# Patient Record
Sex: FEMALE | Race: WHITE | NOT HISPANIC OR LATINO | Employment: FULL TIME
[De-identification: names, ages, dates, MRNs, and addresses within clinical notes are randomized per-mention and may not be internally consistent; named-entity substitution may affect disease eponyms.]

---

## 2017-08-05 ENCOUNTER — HEALTH MAINTENANCE LETTER (OUTPATIENT)
Age: 42
End: 2017-08-05

## 2018-12-07 ENCOUNTER — TELEPHONE (OUTPATIENT)
Dept: OTHER | Facility: CLINIC | Age: 43
End: 2018-12-07

## 2018-12-07 NOTE — TELEPHONE ENCOUNTER
12/7/2018    Call Regarding [employer]boarding [employer] other     Attempt 1    Message on voicemail    Comments: 1 adult, 1 child dep       Outreach   Dilia Inman

## 2018-12-18 NOTE — TELEPHONE ENCOUNTER
12/18/2018    Call Regarding Onboarding [employer] other     Attempt 2    Message on voicemail    Comments:       Outreach   Dilia Inman

## 2019-11-05 ENCOUNTER — HEALTH MAINTENANCE LETTER (OUTPATIENT)
Age: 44
End: 2019-11-05

## 2020-01-21 ENCOUNTER — OFFICE VISIT - HEALTHEAST (OUTPATIENT)
Dept: FAMILY MEDICINE | Facility: CLINIC | Age: 45
End: 2020-01-21

## 2020-01-21 ENCOUNTER — COMMUNICATION - HEALTHEAST (OUTPATIENT)
Dept: TELEHEALTH | Facility: CLINIC | Age: 45
End: 2020-01-21

## 2020-01-21 DIAGNOSIS — Z00.00 ROUTINE GENERAL MEDICAL EXAMINATION AT A HEALTH CARE FACILITY: ICD-10-CM

## 2020-01-21 DIAGNOSIS — Z13.220 LIPID SCREENING: ICD-10-CM

## 2020-01-21 DIAGNOSIS — B00.2 ORAL HERPES: ICD-10-CM

## 2020-01-21 DIAGNOSIS — Z23 ENCOUNTER FOR ADMINISTRATION OF VACCINE: ICD-10-CM

## 2020-01-21 DIAGNOSIS — Z12.31 VISIT FOR SCREENING MAMMOGRAM: ICD-10-CM

## 2020-01-21 DIAGNOSIS — Z13.1 DIABETES MELLITUS SCREENING: ICD-10-CM

## 2020-01-21 DIAGNOSIS — Z76.89 ENCOUNTER TO ESTABLISH CARE WITH NEW DOCTOR: ICD-10-CM

## 2020-01-21 DIAGNOSIS — E66.3 OVERWEIGHT (BMI 25.0-29.9): ICD-10-CM

## 2020-01-21 DIAGNOSIS — Z12.4 CERVICAL CANCER SCREENING: ICD-10-CM

## 2020-01-21 LAB
CHOLEST SERPL-MCNC: 160 MG/DL
FASTING STATUS PATIENT QL REPORTED: YES
FASTING STATUS PATIENT QL REPORTED: YES
GLUCOSE BLD-MCNC: 76 MG/DL (ref 70–125)
HDLC SERPL-MCNC: 55 MG/DL
HGB BLD-MCNC: 14 G/DL (ref 12–16)
LDLC SERPL CALC-MCNC: 85 MG/DL
TRIGL SERPL-MCNC: 100 MG/DL

## 2020-01-21 ASSESSMENT — ANXIETY QUESTIONNAIRES
3. WORRYING TOO MUCH ABOUT DIFFERENT THINGS: NOT AT ALL
7. FEELING AFRAID AS IF SOMETHING AWFUL MIGHT HAPPEN: NOT AT ALL
GAD7 TOTAL SCORE: 0
1. FEELING NERVOUS, ANXIOUS, OR ON EDGE: NOT AT ALL
5. BEING SO RESTLESS THAT IT IS HARD TO SIT STILL: NOT AT ALL
2. NOT BEING ABLE TO STOP OR CONTROL WORRYING: NOT AT ALL
4. TROUBLE RELAXING: NOT AT ALL
6. BECOMING EASILY ANNOYED OR IRRITABLE: NOT AT ALL

## 2020-01-21 ASSESSMENT — MIFFLIN-ST. JEOR: SCORE: 1455.83

## 2020-01-21 ASSESSMENT — PATIENT HEALTH QUESTIONNAIRE - PHQ9: SUM OF ALL RESPONSES TO PHQ QUESTIONS 1-9: 0

## 2020-01-22 ENCOUNTER — COMMUNICATION - HEALTHEAST (OUTPATIENT)
Dept: FAMILY MEDICINE | Facility: CLINIC | Age: 45
End: 2020-01-22

## 2020-01-22 LAB
HPV SOURCE: NORMAL
HUMAN PAPILLOMA VIRUS 16 DNA: NEGATIVE
HUMAN PAPILLOMA VIRUS 18 DNA: NEGATIVE
HUMAN PAPILLOMA VIRUS FINAL DIAGNOSIS: NORMAL
HUMAN PAPILLOMA VIRUS OTHER HR: NEGATIVE
SPECIMEN DESCRIPTION: NORMAL

## 2020-01-29 LAB
BKR LAB AP ABNORMAL BLEEDING: NO
BKR LAB AP BIRTH CONTROL/HORMONES: NORMAL
BKR LAB AP CERVICAL APPEARANCE: NORMAL
BKR LAB AP GYN ADEQUACY: NORMAL
BKR LAB AP GYN INTERPRETATION: NORMAL
BKR LAB AP HPV REFLEX: NORMAL
BKR LAB AP LMP: NORMAL
BKR LAB AP PATIENT STATUS: NORMAL
BKR LAB AP PREVIOUS ABNORMAL: NORMAL
BKR LAB AP PREVIOUS NORMAL: 2013
HIGH RISK?: NO
PATH REPORT.COMMENTS IMP SPEC: NORMAL
RESULT FLAG (HE HISTORICAL CONVERSION): NORMAL

## 2020-02-04 ENCOUNTER — COMMUNICATION - HEALTHEAST (OUTPATIENT)
Dept: FAMILY MEDICINE | Facility: CLINIC | Age: 45
End: 2020-02-04

## 2020-05-28 ENCOUNTER — HOSPITAL ENCOUNTER (OUTPATIENT)
Dept: MAMMOGRAPHY | Facility: CLINIC | Age: 45
Discharge: HOME | End: 2020-05-28
Attending: FAMILY MEDICINE

## 2020-05-28 DIAGNOSIS — Z12.31 VISIT FOR SCREENING MAMMOGRAM: ICD-10-CM

## 2020-10-01 ENCOUNTER — RECORDS - HEALTHEAST (OUTPATIENT)
Dept: ADMINISTRATIVE | Facility: OTHER | Age: 45
End: 2020-10-01

## 2020-11-22 ENCOUNTER — HEALTH MAINTENANCE LETTER (OUTPATIENT)
Age: 45
End: 2020-11-22

## 2020-12-15 ENCOUNTER — ALLIED HEALTH/NURSE VISIT (OUTPATIENT)
Dept: FAMILY MEDICINE | Facility: CLINIC | Age: 45
End: 2020-12-15
Payer: COMMERCIAL

## 2020-12-15 DIAGNOSIS — Z23 NEED FOR PROPHYLACTIC VACCINATION AND INOCULATION AGAINST INFLUENZA: Primary | ICD-10-CM

## 2020-12-15 PROCEDURE — 90471 IMMUNIZATION ADMIN: CPT

## 2020-12-15 PROCEDURE — 90686 IIV4 VACC NO PRSV 0.5 ML IM: CPT

## 2020-12-15 PROCEDURE — 99207 PR NO CHARGE NURSE ONLY: CPT

## 2021-03-16 ENCOUNTER — COMMUNICATION - HEALTHEAST (OUTPATIENT)
Dept: FAMILY MEDICINE | Facility: CLINIC | Age: 46
End: 2021-03-16

## 2021-03-16 DIAGNOSIS — B00.2 ORAL HERPES: ICD-10-CM

## 2021-03-23 ENCOUNTER — AMBULATORY - HEALTHEAST (OUTPATIENT)
Dept: NURSING | Facility: CLINIC | Age: 46
End: 2021-03-23

## 2021-04-02 ENCOUNTER — OFFICE VISIT - HEALTHEAST (OUTPATIENT)
Dept: FAMILY MEDICINE | Facility: CLINIC | Age: 46
End: 2021-04-02

## 2021-04-02 DIAGNOSIS — B00.2 ORAL HERPES: ICD-10-CM

## 2021-04-13 ENCOUNTER — AMBULATORY - HEALTHEAST (OUTPATIENT)
Dept: NURSING | Facility: CLINIC | Age: 46
End: 2021-04-13

## 2021-05-27 ASSESSMENT — PATIENT HEALTH QUESTIONNAIRE - PHQ9: SUM OF ALL RESPONSES TO PHQ QUESTIONS 1-9: 0

## 2021-05-28 ASSESSMENT — ANXIETY QUESTIONNAIRES: GAD7 TOTAL SCORE: 0

## 2021-06-04 VITALS
WEIGHT: 173.31 LBS | HEART RATE: 78 BPM | BODY MASS INDEX: 27.2 KG/M2 | DIASTOLIC BLOOD PRESSURE: 70 MMHG | SYSTOLIC BLOOD PRESSURE: 100 MMHG | HEIGHT: 67 IN

## 2021-06-05 NOTE — PROGRESS NOTES
Assessment/Plan:   Sina is a 44-year-old female here for physical.    1. Encounter to establish care with new doctor  Establishing care today.  All past medical history are reviewed and updated in EMR.    2. Routine general medical examination at a health care facility  Physical completed today.  Pap smear done as below.  Mammogram ordered.  Start colon cancer screening at age 51.  Up-to-date with immunizations except Td which was administered today.  Labs as below.  - Hemoglobin    3. Overweight (BMI 25.0-29.9)  BMI 27.55 today.  Discussed healthy diet and regular excise for weight loss.    4. Encounter for administration of vaccine  Due for Td, administered today.  - Td, Preservative Free (green label)    5. Oral herpes  History cold sores, previously managed with as needed Valtrex, refill sent to pharmacy today.  - valACYclovir (VALTREX) 1000 MG tablet; Take 2 tablets (2,000 mg total) by mouth 2 (two) times a day. With flares  Dispense: 16 tablet; Refill: 3    6. Lipid screening  Given age and weight, will check lipid panel today.  - Lipid [place] FASTING    7. Diabetes mellitus screening  Given age and weight, will screen for diabetes today.  - Glucose    8. Visit for screening mammogram  Due for screening mammogram, ordered today.  - Mammo Screening Bilateral; Future    9. Cervical cancer screening  History of abnormal Pap smears as below, status post LEEP.  Pap smear with HPV testing completed today.  Patient will need testing again in 1 year.  - Gynecologic Cytology (PAP Smear)      I have had an Advance Directives discussion with the patient.  The following high BMI interventions were performed this visit: encouragement to exercise and lifestyle education regarding diet    Follow up: 1 year for physical, sooner if needed    Lucy Funez MD  [facility]    Subjective:     Sina Garcia is a 44 y.o. female who presents for an annual exam.     Healthy Habits:   Healthy Diet: working on it  Regular  Exercise: working on it  Sunscreen Use: Yes  Dental Visits Regularly: yes, recent visit  Seat Belt: Yes  Domestic abuse:   No    Health Maintenance reviewed:  Lipid Profile: yes  Glucose Screen: yes  Colonoscopy: not due  Mammogram: Due    Gynecologic History  Patient's last menstrual period was 01/02/2020. regular periods, light flow  Contraception: IUD, placed Oct 2017  Pap hx: 2008 normal, 2009 normal, 2011 normal, 2013 normal, 2016 LSIL with positive HPV, 2017 ASC-H with positive HPV, colposcopy 2018 KI 3 and KI-1 on biopsies, LEEP 2018 KI-2, no malignancy    Immunization History   Administered Date(s) Administered     Influenza, Seasonal, Inj PF IIV3 12/14/2009, 01/06/2012     Influenza,seasonal, Inj IIV3 11/20/2006, 12/14/2009, 01/06/2012, 11/12/2014     Influenza,seasonal,quad inj =/> 6months 10/31/2017, 10/30/2018, 10/03/2019     PPD Test 10/28/2014, 10/21/2015, 10/17/2016, 10/09/2017, 09/18/2018     Td, adult adsorbed, PF 01/21/2020     Tdap 11/26/2008     Immunization status: up to date and documented, due for Td.    Current Outpatient Medications   Medication Sig Dispense Refill     levonorgestrel (MIRENA) 20 mcg/24 hours (5 yrs) 52 mg IUD 1 each by Intrauterine route.       pediatric multivitamin-iron (POLY-VI-SOL WITH IRON) chewable tablet Chew.       valACYclovir (VALTREX) 1000 MG tablet Take 2 tablets (2,000 mg total) by mouth 2 (two) times a day. With flares 16 tablet 3     No current facility-administered medications for this visit.      History reviewed. No pertinent past medical history.  Past Surgical History:   Procedure Laterality Date     CERVICAL BIOPSY  W/ LOOP ELECTRODE EXCISION  2018     Patient has no known allergies.  Family History   Problem Relation Age of Onset     No Medical Problems Mother      No Medical Problems Father      No Medical Problems Sister      No Medical Problems Brother      Cancer Maternal Grandmother         early 70s     No Medical Problems Maternal Grandfather       No Medical Problems Paternal Grandmother      No Medical Problems Paternal Grandfather      Social History     Socioeconomic History     Marital status: Single     Spouse name: Not on file     Number of children: Not on file     Years of education: Not on file     Highest education level: Not on file   Occupational History     Not on file   Social Needs     Financial resource strain: Not on file     Food insecurity:     Worry: Not on file     Inability: Not on file     Transportation needs:     Medical: Not on file     Non-medical: Not on file   Tobacco Use     Smoking status: Never Smoker     Smokeless tobacco: Never Used   Substance and Sexual Activity     Alcohol use: Yes     Frequency: 2-3 times a week     Drinks per session: 1 or 2     Binge frequency: Never     Drug use: Never     Sexual activity: Not on file   Lifestyle     Physical activity:     Days per week: Not on file     Minutes per session: Not on file     Stress: Not on file   Relationships     Social connections:     Talks on phone: Not on file     Gets together: Not on file     Attends Scientologist service: Not on file     Active member of club or organization: Not on file     Attends meetings of clubs or organizations: Not on file     Relationship status: Not on file     Intimate partner violence:     Fear of current or ex partner: Not on file     Emotionally abused: Not on file     Physically abused: Not on file     Forced sexual activity: Not on file   Other Topics Concern     Not on file   Social History Narrative     Not on file       Review of Systems  General:  Denies problem except overweight  Eyes: Denies problem  Ears/Nose/Throat: Denies problem  Cardiovascular: Denies problem  Respiratory:  Denies problem  Gastrointestinal:  Denies problem   Genitourinary: Denies problem  Musculoskeletal:  Denies problem  Skin: Denies problem  Neurologic: Denies problem  Psychiatric: Denies problem  Endocrine: Denies problem  Heme/Lymphatic: Denies  "problem   Allergic/Immunologic: Denies problem    Objective:        Vitals:    01/21/20 0947   BP: 100/70   Pulse: 78   Weight: 173 lb 5 oz (78.6 kg)   Height: 5' 6.5\" (1.689 m)     Body mass index is 27.55 kg/m .    Physical Exam:  General Appearance: Alert, pleasant, appears stated age, overweight  Head: Normocephalic, without obvious abnormality  Eyes: PERRL, conjunctiva/corneas clear, EOM's intact  Ears: Normal TM's and external ear canals, both ears  Nose: Nares normal, septum midline,mucosa normal, no drainage  Throat: Lips, mucosa, and tongue normal; teeth and gums normal; oropharynx is clear  Neck: Supple,without lymphadenopathy, no thyromegaly or nodules noted  Lungs: Clear to auscultation bilaterally, respirations unlabored, no wheezing or crackles  Heart: Regular rate and rhythm, no murmur   Breast:  Normal appearance.  No palpable masses, nipple discharge, or skin changes  Abdomen: Soft, non-tender, no masses, no organomegaly  Extremities: Extremities with strong and symmetric pulses, no cyanosis or edema  Skin: Skin color, texture normal, no rashes or lesions  Neurologic: Grossly normal, no focal deficits  Pelvic exam: Normal external female genitalia, brown discharge/bleeding noted, mildly stenotic os from prior LEEP, IUD strings not noted (LEEP note on Care Everywhere states \"mirena strings sacrified\")    "

## 2021-06-16 NOTE — TELEPHONE ENCOUNTER
RN cannot approve Refill Request    RN can NOT refill this medication PCP messaged that patient is overdue for Labs and Office Visit. Last office visit: Visit date not found Last Physical: 1/21/2020 Last MTM visit: Visit date not found Last visit same specialty: Visit date not found.  Next visit within 3 mo: Visit date not found  Next physical within 3 mo: Visit date not found      Kortney Michael, [facility] Triage/Med Refill 3/16/2021    Requested Prescriptions   Pending Prescriptions Disp Refills     valACYclovir (VALTREX) 1000 MG tablet [Pharmacy Med Name: VALACYCLOVIR HCL 1 GRAM TABLET] 16 tablet 3     Sig: TAKE 2 TABLETS (2,000 MG TOTAL) BY MOUTH 2 (TWO) TIMES A DAY. WITH FLARES       Antivirals Refill Protocol Failed - 3/16/2021  9:47 AM        Failed - Renal function done in last year     No results found for: CREATININE, CREATININE          Failed - Visit with PCP or prescribing provider visit in past 12 months or next 3 months     Last office visit with prescriber/PCP: Visit date not found OR same dept: Visit date not found OR same specialty: Visit date not found  Last physical: 1/21/2020 Last MTM visit: Visit date not found   Next visit within 3 mo: Visit date not found  Next physical within 3 mo: Visit date not found  Prescriber OR PCP: Lucy Funez MD  Last diagnosis associated with med order: 1. Oral herpes  - valACYclovir (VALTREX) 1000 MG tablet [Pharmacy Med Name: VALACYCLOVIR HCL 1 GRAM TABLET]; Take 2 tablets (2,000 mg total) by mouth 2 (two) times a day. With flares  Dispense: 16 tablet; Refill: 3    If protocol passes may refill for 12 months if within 3 months of last provider visit (or a total of 15 months).             Passed - Patient does not have active pregnancy episode        Passed - Patient has not had positive pregnancy test in last 280 days     No results found for: HCGQUAL, PREGTESTUR, PREGSERUM, BHCG

## 2021-06-20 NOTE — LETTER
Letter by Lucy Funez MD at      Author: Lucy Funez MD Service: -- Author Type: --    Filed:  Encounter Date: 1/22/2020 Status: (Other)         [address] [address]  [address] [facility] [address]     January 22, 2020         Dear Ms. Garcia,    Below are the results from your recent visit:    Resulted Orders   Lipid [place] FASTING   Result Value Ref Range    Cholesterol 160 <=199 mg/dL    Triglycerides 100 <=149 mg/dL    HDL Cholesterol 55 >=50 mg/dL    LDL Calculated 85 <=129 mg/dL    Patient Fasting > 8hrs? Yes    Glucose   Result Value Ref Range    Glucose 76 70 - 125 mg/dL    Patient Fasting > 8hrs? Yes     Narrative    Fasting Glucose reference range is 70-99 mg/dL per  American Diabetes Association (ADA) guidelines.   Hemoglobin   Result Value Ref Range    Hemoglobin 14.0 12.0 - 16.0 g/dL     Your cholesterol levels are all normal - no need for medication.  You tested negative for diabetes - normal glucose level.  Your hemoglobin level is normal.    Please call with questions or contact us using [facility].    Sincerely,         Lucy Funez MD

## 2021-06-20 NOTE — LETTER
Letter by Lucy Funez MD at      Author: Lucy Funez MD Service: -- Author Type: --    Filed:  Encounter Date: 2/4/2020 Status: (Other)         Sina DARBY Jose  [address]  [place] [address]               February 4, 2020    Dear Sina:    Our records indicate that you are due for a mammogram.    In the [place], one in nine women will develop breast cancer during their lifetime. While there is no way to prevent breast cancer, early detection provides the best opportunity for curing it.    For women over the age of 40, the American Cancer Society recommends a yearly clinical breast exam and a yearly mammogram. These practices have saved thousands of lives. We need your help to ensure that you are receiving optimal medical care.    Please make an appointment for a mammogram ([phone]) at your earliest convenience.    Sincerely,    Lucy Funez MD

## 2021-07-07 ENCOUNTER — HOSPITAL ENCOUNTER (OUTPATIENT)
Dept: MAMMOGRAPHY | Facility: CLINIC | Age: 46
Discharge: HOME | End: 2021-07-07
Payer: COMMERCIAL

## 2021-07-07 DIAGNOSIS — Z12.31 VISIT FOR SCREENING MAMMOGRAM: ICD-10-CM

## 2021-07-25 ENCOUNTER — HEALTH MAINTENANCE LETTER (OUTPATIENT)
Age: 46
End: 2021-07-25

## 2021-07-26 ENCOUNTER — MYC MEDICAL ADVICE (OUTPATIENT)
Dept: FAMILY MEDICINE | Facility: CLINIC | Age: 46
End: 2021-07-26

## 2021-07-26 DIAGNOSIS — Z12.11 COLON CANCER SCREENING: Primary | ICD-10-CM

## 2021-09-13 ENCOUNTER — TRANSFERRED RECORDS (OUTPATIENT)
Dept: HEALTH INFORMATION MANAGEMENT | Facility: CLINIC | Age: 46
End: 2021-09-13

## 2021-09-19 ENCOUNTER — HEALTH MAINTENANCE LETTER (OUTPATIENT)
Age: 46
End: 2021-09-19

## 2022-08-17 ENCOUNTER — ANCILLARY PROCEDURE (OUTPATIENT)
Dept: MAMMOGRAPHY | Facility: CLINIC | Age: 47
End: 2022-08-17
Attending: FAMILY MEDICINE
Payer: COMMERCIAL

## 2022-08-17 DIAGNOSIS — Z12.31 VISIT FOR SCREENING MAMMOGRAM: ICD-10-CM

## 2022-08-17 PROCEDURE — 77067 SCR MAMMO BI INCL CAD: CPT

## 2022-08-21 ENCOUNTER — HEALTH MAINTENANCE LETTER (OUTPATIENT)
Age: 47
End: 2022-08-21

## 2022-09-09 DIAGNOSIS — B00.2 ORAL HERPES: ICD-10-CM

## 2022-09-10 NOTE — TELEPHONE ENCOUNTER
"Routing refill request to provider for review/approval because:  Labs not current:  cr  Patient needs to be seen because it has been more than 1 year since last office visit.    Last Written Prescription Date:  5/12/15  Last Fill Quantity: 12,  # refills: 0   Last office visit provider:  4/2/21     Requested Prescriptions   Pending Prescriptions Disp Refills     valACYclovir (VALTREX) 1000 mg tablet [Pharmacy Med Name: VALACYCLOVIR HCL 1 GRAM TABLET] 16 tablet 3     Sig: TAKE 2 TABLETS (2,000 MG TOTAL) BY MOUTH 2 (TWO) TIMES A DAY. WITH FLARES       Antivirals for Herpes Protocol Failed - 9/9/2022  5:42 PM        Failed - Recent (12 mo) or future (30 days) visit within the authorizing provider's specialty     Patient has had an office visit with the authorizing provider or a provider within the authorizing providers department within the previous 12 mos or has a future within next 30 days. See \"Patient Info\" tab in inbasket, or \"Choose Columns\" in Meds & Orders section of the refill encounter.              Failed - Normal serum creatinine on file in past 12 months     No lab results found.    Ok to refill medication if creatinine is low          Passed - Patient is age 12 or older        Passed - Medication is active on med list             Comfort Beard RN 09/10/22 1:06 PM  "

## 2022-09-12 RX ORDER — VALACYCLOVIR HYDROCHLORIDE 1 G/1
TABLET, FILM COATED ORAL
Qty: 16 TABLET | Refills: 3 | Status: SHIPPED | OUTPATIENT
Start: 2022-09-12 | End: 2023-12-01

## 2022-11-21 ENCOUNTER — HEALTH MAINTENANCE LETTER (OUTPATIENT)
Age: 47
End: 2022-11-21

## 2023-09-16 ENCOUNTER — HEALTH MAINTENANCE LETTER (OUTPATIENT)
Age: 48
End: 2023-09-16

## 2023-09-22 ENCOUNTER — ANCILLARY PROCEDURE (OUTPATIENT)
Dept: MAMMOGRAPHY | Facility: CLINIC | Age: 48
End: 2023-09-22
Attending: FAMILY MEDICINE
Payer: COMMERCIAL

## 2023-09-22 DIAGNOSIS — Z12.31 VISIT FOR SCREENING MAMMOGRAM: ICD-10-CM

## 2023-09-22 PROCEDURE — 77067 SCR MAMMO BI INCL CAD: CPT

## 2023-11-30 ASSESSMENT — ENCOUNTER SYMPTOMS
PARESTHESIAS: 0
DYSURIA: 0
COUGH: 0
CONSTIPATION: 0
NERVOUS/ANXIOUS: 0
JOINT SWELLING: 0
MYALGIAS: 0
BREAST MASS: 0
FEVER: 0
SORE THROAT: 0
NAUSEA: 0
HEARTBURN: 0
ARTHRALGIAS: 0
DIZZINESS: 0
HEMATURIA: 0
DIARRHEA: 0
CHILLS: 0
WEAKNESS: 0
SHORTNESS OF BREATH: 0
ABDOMINAL PAIN: 0
HEADACHES: 0
EYE PAIN: 0
HEMATOCHEZIA: 0
FREQUENCY: 0
PALPITATIONS: 0

## 2023-12-01 ENCOUNTER — OFFICE VISIT (OUTPATIENT)
Dept: FAMILY MEDICINE | Facility: CLINIC | Age: 48
End: 2023-12-01
Payer: COMMERCIAL

## 2023-12-01 VITALS
BODY MASS INDEX: 26.57 KG/M2 | TEMPERATURE: 98.5 F | WEIGHT: 169.3 LBS | DIASTOLIC BLOOD PRESSURE: 82 MMHG | RESPIRATION RATE: 16 BRPM | OXYGEN SATURATION: 100 % | SYSTOLIC BLOOD PRESSURE: 121 MMHG | HEART RATE: 89 BPM | HEIGHT: 67 IN

## 2023-12-01 DIAGNOSIS — Z13.0 SCREENING FOR DEFICIENCY ANEMIA: ICD-10-CM

## 2023-12-01 DIAGNOSIS — Z13.228 SCREENING FOR METABOLIC DISORDER: ICD-10-CM

## 2023-12-01 DIAGNOSIS — B00.2 ORAL HERPES: ICD-10-CM

## 2023-12-01 DIAGNOSIS — Z00.00 ROUTINE GENERAL MEDICAL EXAMINATION AT A HEALTH CARE FACILITY: Primary | ICD-10-CM

## 2023-12-01 LAB
ERYTHROCYTE [DISTWIDTH] IN BLOOD BY AUTOMATED COUNT: 11.8 % (ref 10–15)
HCT VFR BLD AUTO: 41.9 % (ref 35–47)
HGB BLD-MCNC: 14.4 G/DL (ref 11.7–15.7)
MCH RBC QN AUTO: 31.5 PG (ref 26.5–33)
MCHC RBC AUTO-ENTMCNC: 34.4 G/DL (ref 31.5–36.5)
MCV RBC AUTO: 92 FL (ref 78–100)
PLATELET # BLD AUTO: 252 10E3/UL (ref 150–450)
RBC # BLD AUTO: 4.57 10E6/UL (ref 3.8–5.2)
WBC # BLD AUTO: 6.7 10E3/UL (ref 4–11)

## 2023-12-01 PROCEDURE — 99396 PREV VISIT EST AGE 40-64: CPT | Mod: 25

## 2023-12-01 PROCEDURE — 85027 COMPLETE CBC AUTOMATED: CPT

## 2023-12-01 PROCEDURE — 36415 COLL VENOUS BLD VENIPUNCTURE: CPT

## 2023-12-01 PROCEDURE — 90686 IIV4 VACC NO PRSV 0.5 ML IM: CPT

## 2023-12-01 PROCEDURE — 80053 COMPREHEN METABOLIC PANEL: CPT

## 2023-12-01 PROCEDURE — 90471 IMMUNIZATION ADMIN: CPT

## 2023-12-01 RX ORDER — VALACYCLOVIR HYDROCHLORIDE 1 G/1
TABLET, FILM COATED ORAL
Qty: 16 TABLET | Refills: 3 | Status: SHIPPED | OUTPATIENT
Start: 2023-12-01

## 2023-12-01 ASSESSMENT — ENCOUNTER SYMPTOMS
NAUSEA: 0
HEADACHES: 0
ARTHRALGIAS: 0
FREQUENCY: 0
HEMATURIA: 0
EYE PAIN: 0
PARESTHESIAS: 0
MYALGIAS: 0
CHILLS: 0
PALPITATIONS: 0
HEARTBURN: 0
DIZZINESS: 0
FEVER: 0
CONSTIPATION: 0
COUGH: 0
BREAST MASS: 0
WEAKNESS: 0
SORE THROAT: 0
HEMATOCHEZIA: 0
SHORTNESS OF BREATH: 0
DIARRHEA: 0
NERVOUS/ANXIOUS: 0
ABDOMINAL PAIN: 0
DYSURIA: 0
JOINT SWELLING: 0

## 2023-12-01 NOTE — ASSESSMENT & PLAN NOTE
Annual exam.  Colonoscopy: Up-to-date.  Mammogram: Up-to-date.  Pap: Up-to-date.  Immunizations: Influenza today.  Will return for COVID.  Labs: Discussed and offered today.  Patient has opted to complete CBC and CMP after our discussion.  No other labs are indicated.  Normal lipid panel 3 years ago.  Mood: Stable.  BMI: 26.92.  Discussed diet and exercise.  Discussed bone health.  Follow-up in 1 year for annual exam or sooner if needed/indicated.

## 2023-12-01 NOTE — PROGRESS NOTES
SUBJECTIVE:   Sina is a pleasant 48 year old, presenting for the following:  Physical  Denies any acute health needs today, but is interested in some basic blood work.      12/1/2023     2:14 PM   Additional Questions   Roomed by ac   Accompanied by self     Healthy Habits:     Getting at least 3 servings of Calcium per day:  Yes    Bi-annual eye exam:  Yes    Dental care twice a year:  Yes    Sleep apnea or symptoms of sleep apnea:  None    Diet:  Regular (no restrictions)    Frequency of exercise:  4-5 days/week    Duration of exercise:  30-45 minutes    Taking medications regularly:  Yes    Medication side effects:  None    Additional concerns today:  No    Diet well rounded. 3 meals a day.   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    Today's PHQ-2 Score:       11/30/2023     5:23 PM   PHQ-2 ( 1999 [employer])   Q1: Little interest or pleasure in doing things 0   Q2: Feeling down, depressed or hopeless 0   PHQ-2 Score 0   Q1: Little interest or pleasure in doing things Not at all   Q2: Feeling down, depressed or hopeless Not at all   PHQ-2 Score 0     Via the Health Maintenance questionnaire, the patient has reported the following services have been completed -Colonscopy, this information has been sent to the abstraction team.    Social History     Tobacco Use    Smoking status: Never    Smokeless tobacco: Never   Substance Use Topics    Alcohol use: Yes     Comment: Occasional         11/30/2023     5:23 PM   Alcohol Use   Prescreen: >3 drinks/day or >7 drinks/week? No     Reviewed orders with patient.  Reviewed health maintenance and updated orders accordingly - Yes  Lab work is in process  Labs reviewed in EPIC  BP Readings from Last 3 Encounters:   12/01/23 121/82   01/21/20 100/70   04/29/14 112/66    Wt Readings from Last 3 Encounters:   12/01/23 76.8 kg (169 lb 4.8 oz)   01/21/20 78.6 kg (173 lb 5 oz)   04/29/14 67.5 kg (148 lb 12.8 oz)                  Patient Active Problem List   Diagnosis    CARDIOVASCULAR  SCREENING; LDL GOAL LESS THAN 160    Oral herpes    Routine general medical examination at a health care facility     Past Surgical History:   Procedure Laterality Date    BIOPSY CERVICAL, LOCAL EXCISION, SINGLE/MULTIPLE  2018    COLONOSCOPY      LASIK  2004       Social History     Tobacco Use    Smoking status: Never    Smokeless tobacco: Never   Substance Use Topics    Alcohol use: Yes     Comment: Occasional     Family History   Problem Relation Age of Onset    No Known Problems Mother     No Known Problems Father     No Known Problems Sister     No Known Problems Brother     Cancer Maternal Grandmother         early 70s    No Known Problems Maternal Grandfather     No Known Problems Paternal Grandmother     No Known Problems Paternal Grandfather     Breast Cancer No family hx of     Colon Cancer No family hx of     Heart Disease No family hx of     Diabetes No family hx of          Current Outpatient Medications   Medication Sig Dispense Refill    valACYclovir (VALTREX) 1000 mg tablet TAKE 2 TABLETS (2,000 MG TOTAL) BY MOUTH 2 (TWO) TIMES A DAY. WITH FLARES 16 tablet 3    Multiple Vitamin (MULTIVITAMINS PO) Take  by mouth daily.       Allergies   Allergen Reactions    Nkda [No Known Drug Allergy]      Recent Labs   Lab Test 20  1038   LDL 85   HDL 55   TRIG 100        Breast Cancer Screenin/30/2023     5:28 PM   Breast CA Risk Assessment (FHS-7)   Do you have a family history of breast, colon, or ovarian cancer? No / Unknown     Patient under 40 years of age: Routine Mammogram Screening not recommended.  and Mammogram Screening: Recommended annual mammography  Pertinent mammograms are reviewed under the imaging tab.    History of abnormal Pap smear: NO - age 30-65 PAP every 5 years with negative HPV co-testing recommended      Latest Ref Rng & Units 2020    10:38 AM 3/26/2013    12:00 AM 2011    12:00 AM   PAP / HPV   PAP Negative for squamous intraepithelial lesion or  "malignancy. Negative for squamous intraepithelial lesion or malignancy  Electronically signed by Nikky He CT (ASCP) on 1/29/2020 at  3:41 PM        PAP (Historical)   NIL  NIL    HPV 16 DNA NEG Negative      HPV 18 DNA NEG Negative      Other HR HPV NEG Negative        Reviewed and updated as needed this visit by clinical staff   Tobacco  Allergies  Meds  Problems  Med Hx  Surg Hx  Fam Hx          Reviewed and updated as needed this visit by Provider   Tobacco  Allergies  Meds  Problems  Med Hx  Surg Hx  Fam Hx         Review of Systems   Constitutional:  Negative for chills and fever.   HENT:  Negative for congestion, ear pain, hearing loss and sore throat.    Eyes:  Negative for pain and visual disturbance.   Respiratory:  Negative for cough and shortness of breath.    Cardiovascular:  Negative for chest pain, palpitations and peripheral edema.   Gastrointestinal:  Negative for abdominal pain, constipation, diarrhea, heartburn, hematochezia and nausea.   Breasts:  Negative for tenderness, breast mass and discharge.   Genitourinary:  Negative for dysuria, frequency, genital sores, hematuria, pelvic pain, urgency, vaginal bleeding and vaginal discharge.   Musculoskeletal:  Negative for arthralgias, joint swelling and myalgias.   Skin:  Negative for rash.   Neurological:  Negative for dizziness, weakness, headaches and paresthesias.   Psychiatric/Behavioral:  Negative for mood changes. The patient is not nervous/anxious.       OBJECTIVE:   /82 (BP Location: Left arm, Patient Position: Sitting, Cuff Size: Adult Regular)   Pulse 89   Temp 98.5  F (36.9  C) (Oral)   Resp 16   Ht 1.689 m (5' 6.5\")   Wt 76.8 kg (169 lb 4.8 oz)   LMP 11/30/2023 (Exact Date)   SpO2 100%   BMI 26.92 kg/m      Physical Exam  GENERAL: healthy, alert and no distress  EYES: Eyes grossly normal to inspection, PERRL and conjunctivae and sclerae normal  HENT: ear canals and TM's normal, nose and mouth " without ulcers or lesions  NECK: no adenopathy, no asymmetry, masses, or scars and thyroid normal to palpation  RESP: lungs clear to auscultation - no rales, rhonchi or wheezes  BREAST: declined due to recent mammogram  CV: regular rate and rhythm, normal S1 S2, no S3 or S4, no murmur, click or rub, no peripheral edema and peripheral pulses strong  ABDOMEN: soft, nontender, no hepatosplenomegaly, no masses and bowel sounds normal  MS: no gross musculoskeletal defects noted, no edema  SKIN: no suspicious lesions or rashes  NEURO: Normal strength and tone, mentation intact and speech normal  PSYCH: mentation appears normal, affect normal/bright    ASSESSMENT/PLAN:     Problem List Items Addressed This Visit       Oral herpes     Manages well with as needed Valtrex. Refill sent today.         Relevant Medications    valACYclovir (VALTREX) 1000 mg tablet    Routine general medical examination at a health care facility - Primary     Annual exam.  Colonoscopy: Up-to-date.  Mammogram: Up-to-date.  Pap: Up-to-date.  Immunizations: Influenza today.  Will return for COVID.  Labs: Discussed and offered today.  Patient has opted to complete CBC and CMP after our discussion.  No other labs are indicated.  Normal lipid panel 3 years ago.  Mood: Stable.  BMI: 26.92.  Discussed diet and exercise.  Discussed bone health.  Follow-up in 1 year for annual exam or sooner if needed/indicated.          Other Visit Diagnoses       Screening for metabolic disorder        Relevant Orders    Comprehensive metabolic panel (BMP + Alb, Alk Phos, ALT, AST, Total. Bili, TP)    Screening for deficiency anemia        Relevant Orders    CBC with platelets          COUNSELING:  Reviewed preventive health counseling, as reflected in patient instructions       Regular exercise       Healthy diet/nutrition       Immunizations  Vaccinated for: Influenza and Declined: Covid-19 due to Conscientious objector             Alcohol Use       Osteoporosis  "prevention/bone health       Colorectal Cancer Screening      BMI:   Estimated body mass index is 26.92 kg/m  as calculated from the following:    Height as of this encounter: 1.689 m (5' 6.5\").    Weight as of this encounter: 76.8 kg (169 lb 4.8 oz).   Weight management plan: Discussed healthy diet and exercise guidelines    She reports that she has never smoked. She has never used smokeless tobacco.    MILENA Francisco CNP  [facility]  "

## 2023-12-01 NOTE — ASSESSMENT & PLAN NOTE
Manages well with as needed Valtrex. Refill sent today.   acetaminophen 500 mg oral tablet: 2 tab(s) orally every 8 hours  Aleve 220 mg oral tablet: 1 orally once a day  ascorbic acid 500 mg oral tablet: 1 orally once a day  aspirin 81 mg oral tablet, chewable: 1 tab(s) orally 2 times a day for 30 days after surgery for DVT prophylaxis  atorvastatin 20 mg oral tablet: 1 orally once a day  celecoxib 200 mg oral capsule: 1 cap(s) orally every 12 hours  D3 25 mcg (1000 intl units) oral tablet: 1 orally once a day  oxyCODONE 10 mg oral tablet: 1 tab(s) orally every 4 hours As needed Severe Pain (7 - 10)  oxyCODONE 5 mg oral tablet: 1 tab(s) orally every 4 hours As needed Moderate Pain (4 - 6)  pantoprazole 40 mg oral delayed release tablet: 1 tab(s) orally once a day (before a meal)  polyethylene glycol 3350 oral powder for reconstitution: 17 gram(s) orally once a day (at bedtime)  senna leaf extract oral tablet: 2 tab(s) orally once a day (at bedtime)  tamsulosin 0.4 mg oral capsule: 1 orally once a day   acetaminophen 500 mg oral tablet: 2 tab(s) orally every 8 hours  Aleve 220 mg oral tablet: 1 orally once a day  ascorbic acid 500 mg oral tablet: 1 orally once a day  aspirin 81 mg oral tablet, chewable: 1 tab(s) orally 2 times a day for 30 days after surgery for DVT prophylaxis  atorvastatin 20 mg oral tablet: 1 orally once a day  celecoxib 100 mg oral capsule: 1 cap(s) orally 2 times a day  D3 25 mcg (1000 intl units) oral tablet: 1 orally once a day  oxyCODONE 10 mg oral tablet: 1 tab(s) orally every 4 hours As needed Severe Pain (7 - 10)  oxyCODONE 5 mg oral tablet: 1 tab(s) orally every 4 hours As needed Moderate Pain (4 - 6)  pantoprazole 40 mg oral delayed release tablet: 1 tab(s) orally once a day (before a meal)  polyethylene glycol 3350 oral powder for reconstitution: 17 gram(s) orally once a day (at bedtime)  senna leaf extract oral tablet: 2 tab(s) orally once a day (at bedtime)  tamsulosin 0.4 mg oral capsule: 1 orally once a day   acetaminophen 500 mg oral tablet: 2 tab(s) orally every 8 hours  ascorbic acid 500 mg oral tablet: 1 orally once a day  aspirin 81 mg oral tablet, chewable: 1 tab(s) orally 2 times a day for 30 days after surgery for DVT prophylaxis  atorvastatin 20 mg oral tablet: 1 orally once a day  celecoxib 100 mg oral capsule: 1 cap(s) orally 2 times a day  D3 25 mcg (1000 intl units) oral tablet: 1 orally once a day  oxyCODONE 5 mg oral tablet: 1 tab(s) orally every 4 hours as needed for  severe pain ; can take 2 tablets every 4-6 hours if pain not controlled by 1 tablet  pantoprazole 40 mg oral delayed release tablet: 1 tab(s) orally once a day (before a meal)  polyethylene glycol 3350 oral powder for reconstitution: 17 gram(s) orally once a day (at bedtime)  senna leaf extract oral tablet: 2 tab(s) orally once a day (at bedtime)  tamsulosin 0.4 mg oral capsule: 1 orally once a day

## 2023-12-02 LAB
ALBUMIN SERPL BCG-MCNC: 4.4 G/DL (ref 3.5–5.2)
ALP SERPL-CCNC: 63 U/L (ref 40–150)
ALT SERPL W P-5'-P-CCNC: 16 U/L (ref 0–50)
ANION GAP SERPL CALCULATED.3IONS-SCNC: 9 MMOL/L (ref 7–15)
AST SERPL W P-5'-P-CCNC: 21 U/L (ref 0–45)
BILIRUB SERPL-MCNC: 0.3 MG/DL
BUN SERPL-MCNC: 10.5 MG/DL (ref 6–20)
CALCIUM SERPL-MCNC: 9.1 MG/DL (ref 8.6–10)
CHLORIDE SERPL-SCNC: 106 MMOL/L (ref 98–107)
CREAT SERPL-MCNC: 0.79 MG/DL (ref 0.51–0.95)
DEPRECATED HCO3 PLAS-SCNC: 26 MMOL/L (ref 22–29)
EGFRCR SERPLBLD CKD-EPI 2021: >90 ML/MIN/1.73M2
GLUCOSE SERPL-MCNC: 74 MG/DL (ref 70–99)
POTASSIUM SERPL-SCNC: 4 MMOL/L (ref 3.4–5.3)
PROT SERPL-MCNC: 7.2 G/DL (ref 6.4–8.3)
SODIUM SERPL-SCNC: 141 MMOL/L (ref 135–145)

## 2024-07-08 ENCOUNTER — MYC MEDICAL ADVICE (OUTPATIENT)
Dept: FAMILY MEDICINE | Facility: CLINIC | Age: 49
End: 2024-07-08
Payer: COMMERCIAL

## 2024-09-29 PROBLEM — Z00.00 ROUTINE GENERAL MEDICAL EXAMINATION AT A HEALTH CARE FACILITY: Status: RESOLVED | Noted: 2023-12-01 | Resolved: 2024-09-29

## 2024-09-30 NOTE — PROGRESS NOTES
Assessment/Plan:    Encounter for management of intrauterine contraceptive device (IUD), unspecified IUD management type  Discussion today regarding contraception options - will plan for IUD removal/reinsertion when schedule allows.     Pelvic pain in female  Pt reports some R pelvic pain, will evaluate further with US.   - US Pelvic Complete with Transvaginal       Follow up: as needed    Lucy Funez MD  [facility]    Subjective:   Sina Garcia is a 49 year old female is here today for IUD management    -IUD placed in 2017 she thinks  -has been noticing more bleeding/period symptoms/cramps  -some dull R pelvic pain, sometimes seems random, last couple months, hard to tell about relation to cycle    Answers submitted by the patient for this visit:  General Questionnaire (Submitted on 9/26/2024)  Chief Complaint: Chronic problems general questions HPI Form  What is the reason for your visit today? : Need a pap smear and new IUD  How many days per week do you miss taking your medication?: 0    Patient Active Problem List   Diagnosis    Oral herpes    Cervical cancer screening     Past Medical History:   Diagnosis Date    NO ACTIVE PROBLEMS      Past Surgical History:   Procedure Laterality Date    BIOPSY CERVICAL, LOCAL EXCISION, SINGLE/MULTIPLE  01/01/2018    COLONOSCOPY      LASIK  01/01/2004     Current Outpatient Medications   Medication Sig Dispense Refill    Multiple Vitamin (MULTIVITAMINS PO) Take  by mouth daily.      valACYclovir (VALTREX) 1000 mg tablet TAKE 2 TABLETS (2,000 MG TOTAL) BY MOUTH 2 (TWO) TIMES A DAY. WITH FLARES 16 tablet 3     No current facility-administered medications for this visit.     Allergies   Allergen Reactions    Nkda [No Known Drug Allergy]      Social History     Socioeconomic History    Marital status: Single     Spouse name: Not on file    Number of children: 2    Years of education: 14    Highest education level: Not on file   Occupational History     Occupation: Manager     Employer: OTHER     Comment: [employer]   Tobacco Use    Smoking status: Never    Smokeless tobacco: Never   Vaping Use    Vaping status: Never Used   Substance and Sexual Activity    Alcohol use: Yes     Comment: Occasional    Drug use: Never    Sexual activity: Yes     Partners: Male     Birth control/protection: I.U.D.   Other Topics Concern    Parent/sibling w/ CABG, MI or angioplasty before 65F 55M? No   Social History Narrative    Not on file     Social Determinants of Health     Financial Resource Strain: Low Risk  (11/30/2023)    Financial Resource Strain     Within the past 12 months, have you or your family members you live with been unable to get utilities (heat, electricity) when it was really needed?: No   Food Insecurity: Low Risk  (11/30/2023)    Food Insecurity     Within the past 12 months, did you worry that your food would run out before you got money to buy more?: No     Within the past 12 months, did the food you bought just not last and you didn t have money to get more?: No   Transportation Needs: Low Risk  (11/30/2023)    Transportation Needs     Within the past 12 months, has lack of transportation kept you from medical appointments, getting your medicines, non-medical meetings or appointments, work, or from getting things that you need?: No   Physical Activity: Not on file   Stress: Not on file   Social Connections: Not on file   Interpersonal Safety: Low Risk  (10/1/2024)    Interpersonal Safety     Do you feel physically and emotionally safe where you currently live?: Yes     Within the past 12 months, have you been hit, slapped, kicked or otherwise physically hurt by someone?: No     Within the past 12 months, have you been humiliated or emotionally abused in other ways by your partner or ex-partner?: No   Housing Stability: Low Risk  (11/30/2023)    Housing Stability     Do you have housing? : Yes     Are you worried about losing your housing?: No  "    Family History   Problem Relation Age of Onset    No Known Problems Mother     No Known Problems Father     No Known Problems Sister     No Known Problems Brother     Cancer Maternal Grandmother         early 70s    No Known Problems Maternal Grandfather     No Known Problems Paternal Grandmother     No Known Problems Paternal Grandfather     Breast Cancer No family hx of     Colon Cancer No family hx of     Heart Disease No family hx of     Diabetes No family hx of      Review of systems is as stated in HPI, and the remainder of system review is otherwise negative.    Objective:     /70   Pulse 82   Temp 98.8  F (37.1  C) (Temporal)   Resp 16   Ht 1.676 m (5' 6\")   Wt 78.4 kg (172 lb 12.8 oz)   LMP 08/29/2024 (Approximate)   SpO2 98%   BMI 27.89 kg/m      General appearance: awake, NAD  HEENT: atraumatic, normocephalic, no scleral icterus or injection  Lungs: breathing comfortably on room air  Extremities: no LE edema bilaterally, moving all extremities  Skin: no rashes or lesions  Neuro: alert, oriented x3, CNs grossly intact, no focal deficits appreciated  Psych: normal mood/affect/behavior, answering questions appropriately, linear thought process    "

## 2024-10-01 ENCOUNTER — OFFICE VISIT (OUTPATIENT)
Dept: FAMILY MEDICINE | Facility: CLINIC | Age: 49
End: 2024-10-01
Payer: COMMERCIAL

## 2024-10-01 VITALS
BODY MASS INDEX: 27.77 KG/M2 | HEART RATE: 82 BPM | RESPIRATION RATE: 16 BRPM | HEIGHT: 66 IN | TEMPERATURE: 98.8 F | DIASTOLIC BLOOD PRESSURE: 70 MMHG | SYSTOLIC BLOOD PRESSURE: 120 MMHG | OXYGEN SATURATION: 98 % | WEIGHT: 172.8 LBS

## 2024-10-01 DIAGNOSIS — R10.2 PELVIC PAIN IN FEMALE: ICD-10-CM

## 2024-10-01 DIAGNOSIS — Z30.431 ENCOUNTER FOR MANAGEMENT OF INTRAUTERINE CONTRACEPTIVE DEVICE (IUD), UNSPECIFIED IUD MANAGEMENT TYPE: Primary | ICD-10-CM

## 2024-10-01 PROCEDURE — 90656 IIV3 VACC NO PRSV 0.5 ML IM: CPT | Performed by: FAMILY MEDICINE

## 2024-10-01 PROCEDURE — 90471 IMMUNIZATION ADMIN: CPT | Performed by: FAMILY MEDICINE

## 2024-10-01 PROCEDURE — 99213 OFFICE O/P EST LOW 20 MIN: CPT | Mod: 25 | Performed by: FAMILY MEDICINE

## 2024-10-01 ASSESSMENT — PAIN SCALES - GENERAL: PAINLEVEL: NO PAIN (0)

## 2024-11-08 ENCOUNTER — ANCILLARY PROCEDURE (OUTPATIENT)
Dept: MAMMOGRAPHY | Facility: CLINIC | Age: 49
End: 2024-11-08
Attending: FAMILY MEDICINE
Payer: COMMERCIAL

## 2024-11-08 DIAGNOSIS — Z12.31 VISIT FOR SCREENING MAMMOGRAM: ICD-10-CM

## 2024-11-08 PROCEDURE — 77063 BREAST TOMOSYNTHESIS BI: CPT

## 2024-12-02 NOTE — PROGRESS NOTES
"PROCEDURE NOTE     SUBJECTIVE: Sina Garcia is a 49 year old old  female, requests removal and reinsertion of her mirena IUD  Reason for removal: removal and reinsertion    Consent:  Risks, benefits of treatment, and alternative options for contraception were discussed.  Patient's questions were elicited and answered.  Written consent was obtained and scanned into medical record.     OBJECTIVE:   /60   Pulse 99   Temp 98.8  F (37.1  C) (Temporal)   Resp 16   Ht 1.676 m (5' 6\")   Wt 78 kg (172 lb)   LMP 10/28/2024 (Approximate)   SpO2 98%   BMI 27.76 kg/m      UPT negative    Procedure:   Cervix was visualized with a speculum. IUD strings were not visible - pap smear performed and still IUD strings not visible.    EBL: minimal  Complications: none  Tolerance: Pt tolerated procedure well and was in stable condition.     ASSESSMENT AND PLAN:  Intrauterine contraceptive device threads lost, initial encounter  IUD strings not visible today - ordered pelvic US to assess if IUD has been expelled or if it is present in uterus but strings are not accessible. Discussed if IUD expelled then pt can come back to insertion - but if IUD still in place then would need OBGYN referral to get removed/reinserted.  - US Pelvic Complete with Transvaginal    Cervical cancer screening  Due for pap smear in January, cotesting completed today.  - HPV and Gynecologic Cytology Panel - Recommended Age 30 - 65 Years    Lucy Funez MD  [facility]   "

## 2024-12-04 ENCOUNTER — OFFICE VISIT (OUTPATIENT)
Dept: FAMILY MEDICINE | Facility: CLINIC | Age: 49
End: 2024-12-04
Payer: COMMERCIAL

## 2024-12-04 VITALS
BODY MASS INDEX: 27.64 KG/M2 | TEMPERATURE: 98.8 F | OXYGEN SATURATION: 98 % | RESPIRATION RATE: 16 BRPM | HEART RATE: 99 BPM | SYSTOLIC BLOOD PRESSURE: 120 MMHG | DIASTOLIC BLOOD PRESSURE: 60 MMHG | HEIGHT: 66 IN | WEIGHT: 172 LBS

## 2024-12-04 DIAGNOSIS — Z12.4 CERVICAL CANCER SCREENING: ICD-10-CM

## 2024-12-04 DIAGNOSIS — T83.32XA INTRAUTERINE CONTRACEPTIVE DEVICE THREADS LOST, INITIAL ENCOUNTER: Primary | ICD-10-CM

## 2024-12-04 LAB — HCG UR QL: NEGATIVE

## 2024-12-04 ASSESSMENT — PAIN SCALES - GENERAL: PAINLEVEL_OUTOF10: NO PAIN (0)

## 2024-12-05 LAB
HPV HR 12 DNA CVX QL NAA+PROBE: NEGATIVE
HPV16 DNA CVX QL NAA+PROBE: NEGATIVE
HPV18 DNA CVX QL NAA+PROBE: NEGATIVE
HUMAN PAPILLOMA VIRUS FINAL DIAGNOSIS: NORMAL

## 2024-12-09 ENCOUNTER — PATIENT OUTREACH (OUTPATIENT)
Dept: FAMILY MEDICINE | Facility: CLINIC | Age: 49
End: 2024-12-09
Payer: COMMERCIAL

## 2024-12-09 LAB
BKR AP ASSOCIATED HPV REPORT: NORMAL
BKR LAB AP GYN ADEQUACY: NORMAL
BKR LAB AP GYN INTERPRETATION: NORMAL
BKR LAB AP LMP: NORMAL
BKR LAB AP PREVIOUS ABNL DX: NORMAL
BKR LAB AP PREVIOUS ABNORMAL: NORMAL
PATH REPORT.COMMENTS IMP SPEC: NORMAL
PATH REPORT.COMMENTS IMP SPEC: NORMAL
PATH REPORT.RELEVANT HX SPEC: NORMAL

## 2024-12-10 ENCOUNTER — HOSPITAL ENCOUNTER (OUTPATIENT)
Dept: ULTRASOUND IMAGING | Facility: HOSPITAL | Age: 49
Discharge: HOME | End: 2024-12-10
Attending: FAMILY MEDICINE
Payer: COMMERCIAL

## 2024-12-10 DIAGNOSIS — T83.32XA INTRAUTERINE CONTRACEPTIVE DEVICE THREADS LOST, INITIAL ENCOUNTER: ICD-10-CM

## 2024-12-10 PROCEDURE — 76856 US EXAM PELVIC COMPLETE: CPT

## 2025-01-04 ENCOUNTER — HEALTH MAINTENANCE LETTER (OUTPATIENT)
Age: 50
End: 2025-01-04

## 2025-01-28 NOTE — PROGRESS NOTES
Sina Garcia is a 46 y.o. female who is being evaluated via a billable video visit.      How would you like to obtain your AVS? MyChart.  If dropped from the video visit, the video invitation should be resent by: Send to e-mail at: [email or web address]  Will anyone else be joining your video visit? No    Video Start Time: 1:52 PM    Assessment & Plan     1. Oral herpes  - valACYclovir (VALTREX) 1000 MG tablet; Take 2 tablets (2,000 mg total) by mouth 2 (two) times a day. With flares  Dispense: 16 tablet; Refill: 3      Marck Grimes MD  [facility]    Subjective   Sina Garcia is 46 y.o. and presents today for the following health issues   HPI     Chief Complaint   Patient presents with     med check     HPI: Patient just wanting a refill on a prescription over a year ago.  When she went to a get a refill stated she had to have an appointment.  Medicine in question is valacyclovir which she uses for cold sores.  She is only use this once or twice in the last year.  No other concerns today.        Objective           Video-Visit Details    Type of service:  Video Visit    Video End Time (time video stopped): 1:57 PM  Originating Location (pt. Location): Home    Distant Location (provider location):  [facility]     Platform used for Video Visit: [employer]   Other

## 2025-04-02 SDOH — HEALTH STABILITY: PHYSICAL HEALTH: ON AVERAGE, HOW MANY MINUTES DO YOU ENGAGE IN EXERCISE AT THIS LEVEL?: 50 MIN

## 2025-04-02 SDOH — HEALTH STABILITY: PHYSICAL HEALTH: ON AVERAGE, HOW MANY DAYS PER WEEK DO YOU ENGAGE IN MODERATE TO STRENUOUS EXERCISE (LIKE A BRISK WALK)?: 5 DAYS

## 2025-04-02 ASSESSMENT — SOCIAL DETERMINANTS OF HEALTH (SDOH): HOW OFTEN DO YOU GET TOGETHER WITH FRIENDS OR RELATIVES?: ONCE A WEEK

## 2025-04-03 ENCOUNTER — OFFICE VISIT (OUTPATIENT)
Dept: FAMILY MEDICINE | Facility: CLINIC | Age: 50
End: 2025-04-03
Payer: COMMERCIAL

## 2025-04-03 VITALS
BODY MASS INDEX: 28.33 KG/M2 | SYSTOLIC BLOOD PRESSURE: 119 MMHG | TEMPERATURE: 98.9 F | HEIGHT: 66 IN | HEART RATE: 81 BPM | WEIGHT: 176.31 LBS | RESPIRATION RATE: 16 BRPM | OXYGEN SATURATION: 98 % | DIASTOLIC BLOOD PRESSURE: 79 MMHG

## 2025-04-03 DIAGNOSIS — Z97.5 IUD (INTRAUTERINE DEVICE) IN PLACE: ICD-10-CM

## 2025-04-03 DIAGNOSIS — Z13.228 SCREENING FOR METABOLIC DISORDER: ICD-10-CM

## 2025-04-03 DIAGNOSIS — Z13.220 SCREENING FOR LIPID DISORDERS: ICD-10-CM

## 2025-04-03 DIAGNOSIS — Z00.00 ROUTINE GENERAL MEDICAL EXAMINATION AT A HEALTH CARE FACILITY: Primary | ICD-10-CM

## 2025-04-03 DIAGNOSIS — L65.9 HAIR LOSS: ICD-10-CM

## 2025-04-03 LAB
ALBUMIN SERPL BCG-MCNC: 4.4 G/DL (ref 3.5–5.2)
ALP SERPL-CCNC: 76 U/L (ref 40–150)
ALT SERPL W P-5'-P-CCNC: 17 U/L (ref 0–50)
ANION GAP SERPL CALCULATED.3IONS-SCNC: 12 MMOL/L (ref 7–15)
AST SERPL W P-5'-P-CCNC: 23 U/L (ref 0–45)
BILIRUB SERPL-MCNC: 0.4 MG/DL
BUN SERPL-MCNC: 10.2 MG/DL (ref 6–20)
CALCIUM SERPL-MCNC: 9.4 MG/DL (ref 8.8–10.4)
CHLORIDE SERPL-SCNC: 106 MMOL/L (ref 98–107)
CHOLEST SERPL-MCNC: 181 MG/DL
CREAT SERPL-MCNC: 0.76 MG/DL (ref 0.51–0.95)
EGFRCR SERPLBLD CKD-EPI 2021: >90 ML/MIN/1.73M2
ERYTHROCYTE [DISTWIDTH] IN BLOOD BY AUTOMATED COUNT: 11.8 % (ref 10–15)
FASTING STATUS PATIENT QL REPORTED: YES
FASTING STATUS PATIENT QL REPORTED: YES
FERRITIN SERPL-MCNC: 85 NG/ML (ref 6–175)
GLUCOSE SERPL-MCNC: 76 MG/DL (ref 70–99)
HCO3 SERPL-SCNC: 23 MMOL/L (ref 22–29)
HCT VFR BLD AUTO: 42.3 % (ref 35–47)
HDLC SERPL-MCNC: 66 MG/DL
HGB BLD-MCNC: 14.2 G/DL (ref 11.7–15.7)
IRON BINDING CAPACITY (ROCHE): 302 UG/DL (ref 240–430)
IRON SATN MFR SERPL: 52 % (ref 15–46)
IRON SERPL-MCNC: 158 UG/DL (ref 37–145)
LDLC SERPL CALC-MCNC: 95 MG/DL
MCH RBC QN AUTO: 30.9 PG (ref 26.5–33)
MCHC RBC AUTO-ENTMCNC: 33.6 G/DL (ref 31.5–36.5)
MCV RBC AUTO: 92 FL (ref 78–100)
NONHDLC SERPL-MCNC: 115 MG/DL
PLATELET # BLD AUTO: 229 10E3/UL (ref 150–450)
POTASSIUM SERPL-SCNC: 4.1 MMOL/L (ref 3.4–5.3)
PROT SERPL-MCNC: 7.5 G/DL (ref 6.4–8.3)
RBC # BLD AUTO: 4.59 10E6/UL (ref 3.8–5.2)
SODIUM SERPL-SCNC: 141 MMOL/L (ref 135–145)
TRIGL SERPL-MCNC: 101 MG/DL
TSH SERPL DL<=0.005 MIU/L-ACNC: 1.93 UIU/ML (ref 0.3–4.2)
WBC # BLD AUTO: 5.7 10E3/UL (ref 4–11)

## 2025-04-03 NOTE — PATIENT INSTRUCTIONS
Patient Education   Preventive Care Advice   This is general advice given by our system to help you stay healthy. However, your care team may have specific advice just for you. Please talk to your care team about your preventive care needs.  Nutrition  Eat 5 or more servings of fruits and vegetables each day.  Try wheat bread, brown rice and whole grain pasta (instead of white bread, rice, and pasta).  Get enough calcium and vitamin D. Check the label on foods and aim for 100% of the RDA (recommended daily allowance).  Lifestyle  Exercise at least 150 minutes each week  (30 minutes a day, 5 days a week).  Do muscle strengthening activities 2 days a week. These help control your weight and prevent disease.  No smoking.  Wear sunscreen to prevent skin cancer.  Have a dental exam and cleaning every 6 months.  Yearly exams  See your health care team every year to talk about:  Any changes in your health.  Any medicines your care team has prescribed.  Preventive care, family planning, and ways to prevent chronic diseases.  Shots (vaccines)   HPV shots (up to age 26), if you've never had them before.  Hepatitis B shots (up to age 59), if you've never had them before.  COVID-19 shot: Get this shot when it's due.  Flu shot: Get a flu shot every year.  Tetanus shot: Get a tetanus shot every 10 years.  Pneumococcal, hepatitis A, and RSV shots: Ask your care team if you need these based on your risk.  Shingles shot (for age 50 and up)  General health tests  Diabetes screening:  Starting at age 35, Get screened for diabetes at least every 3 years.  If you are younger than age 35, ask your care team if you should be screened for diabetes.  Cholesterol test: At age 39, start having a cholesterol test every 5 years, or more often if advised.  Bone density scan (DEXA): At age 50, ask your care team if you should have this scan for osteoporosis (brittle bones).  Hepatitis C: Get tested at least once in your life.  STIs (sexually  transmitted infections)  Before age 24: Ask your care team if you should be screened for STIs.  After age 24: Get screened for STIs if you're at risk. You are at risk for STIs (including HIV) if:  You are sexually active with more than one person.  You don't use condoms every time.  You or a partner was diagnosed with a sexually transmitted infection.  If you are at risk for HIV, ask about PrEP medicine to prevent HIV.  Get tested for HIV at least once in your life, whether you are at risk for HIV or not.  Cancer screening tests  Cervical cancer screening: If you have a cervix, begin getting regular cervical cancer screening tests starting at age 21.  Breast cancer scan (mammogram): If you've ever had breasts, begin having regular mammograms starting at age 40. This is a scan to check for breast cancer.  Colon cancer screening: It is important to start screening for colon cancer at age 45.  Have a colonoscopy test every 10 years (or more often if you're at risk) Or, ask your provider about stool tests like a FIT test every year or Cologuard test every 3 years.  To learn more about your testing options, visit:   .  For help making a decision, visit:   https://bit.ly/ph07505.  Prostate cancer screening test: If you have a prostate, ask your care team if a prostate cancer screening test (PSA) at age 55 is right for you.  Lung cancer screening: If you are a current or former smoker ages 50 to 80, ask your care team if ongoing lung cancer screenings are right for you.  For informational purposes only. Not to replace the advice of your health care provider. Copyright   2023 [employer]. All rights reserved. Clinically reviewed by the [facility] Transitions Program. [employer] 240502 - REV 01/24.

## 2025-04-03 NOTE — PROGRESS NOTES
"Preventive Care Visit  [facility]  MILENA Francisco CNP, Family Medicine  Apr 3, 2025      Assessment & Plan   Assessment & Plan  Routine general medical examination at a health care facility  Annual exam.  Mammogram: UTD  PAP: UTD  Colonoscopy: UTD  Immunizations: UTD   Labs: Discussed and offered  Mood: No concerns  BMI: 28.89. Discussed diet and exercise. Encouraged strength training.   Bone health: DEXA not indicated. Discussed bone health principles  Contraception: IUD  Recommend follow up in one year for annual exam or sooner if needed/indicated elsewhere.       Hair loss  Will check labs today. Scalp exam is without abnormal findings. Not overly bothersome to patient so not interested in treatment. Discussed things such as decreasing frequency of shampooing and scalp massage.   Orders:    CBC with platelets; Future    Iron and iron binding capacity; Future    Ferritin; Future    TSH with free T4 reflex; Future    IUD (intrauterine device) in place  Patient had attempted to have IUD removed in clinic setting with another provider however they were unable to visualize strings. She had ultrasound confirming placement and now just needs to schedule with gynecology for removal/replacement.       Screening for lipid disorders    Orders:    Lipid panel reflex to direct LDL Non-fasting; Future    Screening for metabolic disorder    Orders:    Comprehensive metabolic panel (BMP + Alb, Alk Phos, ALT, AST, Total. Bili, TP); Future      Patient has been advised of split billing requirements and indicates understanding: Yes       BMI  Estimated body mass index is 28.89 kg/m  as calculated from the following:    Height as of this encounter: 1.664 m (5' 5.5\").    Weight as of this encounter: 80 kg (176 lb 5 oz).   Weight management plan: Discussed healthy diet and exercise guidelines    Counseling  Appropriate preventive services were addressed with this patient via screening, questionnaire, " or discussion as appropriate for fall prevention, nutrition, physical activity, Tobacco-use cessation, social engagement, weight loss and cognition.  Checklist reviewing preventive services available has been given to the patient.  Reviewed patient's diet, addressing concerns and/or questions.     Pennie Andino is a 50 year old, presenting for the following:  Physical (Pt. Nonfasting. PAP UTD.), Hair Loss, and Heartburn (Sx started last 2 months.)        4/3/2025     9:05 AM   Additional Questions   Roomed by sac   Accompanied by self         4/3/2025     9:05 AM   Patient Reported Additional Medications   Patient reports taking the following new medications no      Patient is a very pleasant 50 year old presenting today for her annual physical. She is interested in discussing some increased frequency of hair loss that she has noticed but has no other concerns about her health.    Advance Care Planning  Patient does not have a Health Care Directive: Discussed advance care planning with patient; information given to patient to review.      4/2/2025   General Health   How would you rate your overall physical health? Good   Feel stress (tense, anxious, or unable to sleep) Only a little   (!) STRESS CONCERN      4/2/2025   Nutrition   Three or more servings of calcium each day? Yes   Diet: Regular (no restrictions)   How many servings of fruit and vegetables per day? (!) 2-3   How many sweetened beverages each day? 0-1         4/2/2025   Exercise   Days per week of moderate/strenous exercise 5 days   Average minutes spent exercising at this level 50 min         4/2/2025   Social Factors   Frequency of gathering with friends or relatives Once a week   Worry food won't last until get money to buy more No   Food not last or not have enough money for food? No   Do you have housing? (Housing is defined as stable permanent housing and does not include staying ouside in a car, in a tent, in an abandoned building, in an  overnight shelter, or couch-surfing.) Yes   Are you worried about losing your housing? No   Lack of transportation? No   Unable to get utilities (heat,electricity)? No         4/2/2025   Dental   Dentist two times every year? Yes     Today's PHQ-2 Score:       4/2/2025     9:33 AM   PHQ-2 ( 1999 [employer])   Q1: Little interest or pleasure in doing things 0   Q2: Feeling down, depressed or hopeless 0   PHQ-2 Score 0    Q1: Little interest or pleasure in doing things Not at all   Q2: Feeling down, depressed or hopeless Not at all   PHQ-2 Score 0       Patient-reported         4/2/2025   Substance Use   Alcohol more than 3/day or more than 7/wk No   Do you use any other substances recreationally? No     Social History     Tobacco Use    Smoking status: Never    Smokeless tobacco: Never   Vaping Use    Vaping status: Never Used   Substance Use Topics    Alcohol use: Yes     Comment: Occasional    Drug use: Never         11/8/2024   LAST FHS-7 RESULTS   1st degree relative breast or ovarian cancer No   Any relative bilateral breast cancer No   Any male have breast cancer No   Any ONE woman have BOTH breast AND ovarian cancer No   Any woman with breast cancer before 50yrs No   2 or more relatives with breast AND/OR ovarian cancer No   2 or more relatives with breast AND/OR bowel cancer No     Mammogram Screening - Mammogram every 1-2 years updated in Health Maintenance based on mutual decision making        4/2/2025   STI Screening   New sexual partner(s) since last STI/HIV test? No     History of abnormal Pap smear: YES - reflected in Problem List and Health Maintenance accordingly        Latest Ref Rng & Units 12/4/2024    12:30 PM 1/21/2020    10:38 AM 3/26/2013    12:00 AM   PAP / HPV   PAP  Negative for Intraepithelial Lesion or Malignancy (NILM)  Negative for squamous intraepithelial lesion or malignancy  Electronically signed by Nikky He CT (ASCP) on 1/29/2020 at  3:41 PM       PAP (Historical)    NIL   "  HPV 16 DNA Negative Negative  Negative     HPV 18 DNA Negative Negative  Negative     Other HR HPV Negative Negative  Negative       ASCVD Risk   The ASCVD Risk score (Simona BARKSDALE, et al., 2019) failed to calculate for the following reasons:    Cannot find a previous HDL lab    Cannot find a previous total cholesterol lab        4/2/2025   Contraception/Family Planning   Questions about contraception or family planning No        Reviewed and updated as needed this visit by Provider                     Objective    Exam  Ht 1.664 m (5' 5.5\")   Wt 80 kg (176 lb 5 oz)   LMP 02/19/2025 (Approximate)   BMI 28.89 kg/m     Estimated body mass index is 28.89 kg/m  as calculated from the following:    Height as of this encounter: 1.664 m (5' 5.5\").    Weight as of this encounter: 80 kg (176 lb 5 oz).    Physical Exam  GENERAL: alert and no distress  EYES: Eyes grossly normal to inspection, PERRL and conjunctivae and sclerae normal  HENT: ear canals and TM's normal, nose and mouth without ulcers or lesions  NECK: no adenopathy, no asymmetry, masses, or scars  RESP: lungs clear to auscultation - no rales, rhonchi or wheezes  BREAST: declined by patient. Discussed breast awareness.  CV: regular rate and rhythm, normal S1 S2, no S3 or S4, no murmur, click or rub, no peripheral edema  ABDOMEN: soft, nontender, no hepatosplenomegaly, no masses and bowel sounds normal  MS: no gross musculoskeletal defects noted, no edema  SKIN: no suspicious lesions or rashes  NEURO: Normal strength and tone, mentation intact and speech normal  PSYCH: mentation appears normal, affect normal/bright    Signed Electronically by: MILENA Francisco CNP    "

## 2025-04-05 ENCOUNTER — MYC MEDICAL ADVICE (OUTPATIENT)
Dept: FAMILY MEDICINE | Facility: CLINIC | Age: 50
End: 2025-04-05
Payer: COMMERCIAL

## 2025-04-24 ENCOUNTER — TRANSFERRED RECORDS (OUTPATIENT)
Dept: HEALTH INFORMATION MANAGEMENT | Facility: CLINIC | Age: 50
End: 2025-04-24
Payer: COMMERCIAL